# Patient Record
Sex: MALE | Race: AMERICAN INDIAN OR ALASKA NATIVE | HISPANIC OR LATINO
[De-identification: names, ages, dates, MRNs, and addresses within clinical notes are randomized per-mention and may not be internally consistent; named-entity substitution may affect disease eponyms.]

---

## 2020-09-24 PROBLEM — Z00.00 ENCOUNTER FOR PREVENTIVE HEALTH EXAMINATION: Status: ACTIVE | Noted: 2020-09-24

## 2020-10-06 ENCOUNTER — APPOINTMENT (OUTPATIENT)
Dept: ORTHOPEDIC SURGERY | Facility: CLINIC | Age: 70
End: 2020-10-06
Payer: MEDICARE

## 2020-10-06 VITALS — HEIGHT: 65 IN | BODY MASS INDEX: 24.16 KG/M2 | RESPIRATION RATE: 16 BRPM | WEIGHT: 145 LBS

## 2020-10-06 DIAGNOSIS — M72.0 PALMAR FASCIAL FIBROMATOSIS [DUPUYTREN]: ICD-10-CM

## 2020-10-06 DIAGNOSIS — Z86.39 PERSONAL HISTORY OF OTHER ENDOCRINE, NUTRITIONAL AND METABOLIC DISEASE: ICD-10-CM

## 2020-10-06 DIAGNOSIS — Z86.79 PERSONAL HISTORY OF OTHER DISEASES OF THE CIRCULATORY SYSTEM: ICD-10-CM

## 2020-10-06 PROCEDURE — 99203 OFFICE O/P NEW LOW 30 MIN: CPT

## 2020-10-06 RX ORDER — TAMSULOSIN HYDROCHLORIDE 0.4 MG/1
0.4 CAPSULE ORAL
Refills: 0 | Status: ACTIVE | COMMUNITY

## 2020-10-06 RX ORDER — PANTOPRAZOLE SODIUM 40 MG/1
40 TABLET, DELAYED RELEASE ORAL
Refills: 0 | Status: ACTIVE | COMMUNITY

## 2020-10-06 RX ORDER — VALSARTAN 40 MG/1
TABLET, COATED ORAL
Refills: 0 | Status: ACTIVE | COMMUNITY

## 2020-10-06 RX ORDER — FINASTERIDE 5 MG/1
5 TABLET, FILM COATED ORAL
Refills: 0 | Status: ACTIVE | COMMUNITY

## 2020-10-06 RX ORDER — METFORMIN HYDROCHLORIDE 1000 MG/1
1000 TABLET, COATED ORAL
Refills: 0 | Status: ACTIVE | COMMUNITY

## 2020-10-06 RX ORDER — ATENOLOL 25 MG/1
25 TABLET ORAL
Refills: 0 | Status: ACTIVE | COMMUNITY

## 2022-04-28 PROBLEM — G93.9 BRAIN LESION: Status: ACTIVE | Noted: 2022-04-28

## 2022-05-02 ENCOUNTER — NON-APPOINTMENT (OUTPATIENT)
Age: 72
End: 2022-05-02

## 2022-05-02 ENCOUNTER — APPOINTMENT (OUTPATIENT)
Dept: NEUROSURGERY | Facility: CLINIC | Age: 72
End: 2022-05-02
Payer: MEDICARE

## 2022-05-02 VITALS
WEIGHT: 142 LBS | HEIGHT: 65 IN | TEMPERATURE: 98.1 F | BODY MASS INDEX: 23.66 KG/M2 | SYSTOLIC BLOOD PRESSURE: 150 MMHG | HEART RATE: 55 BPM | OXYGEN SATURATION: 98 % | DIASTOLIC BLOOD PRESSURE: 71 MMHG

## 2022-05-02 DIAGNOSIS — G93.9 DISORDER OF BRAIN, UNSPECIFIED: ICD-10-CM

## 2022-05-02 PROCEDURE — 99205 OFFICE O/P NEW HI 60 MIN: CPT

## 2022-05-02 NOTE — REASON FOR VISIT
[New Patient Visit] : a new patient visit [Referred By: _________] : Patient was referred by TABITHA [Family Member] : family member

## 2022-05-02 NOTE — PHYSICAL EXAM
[General Appearance - Alert] : alert [General Appearance - In No Acute Distress] : in no acute distress [General Appearance - Well-Appearing] : healthy appearing [Oriented To Time, Place, And Person] : oriented to person, place, and time [Impaired Insight] : insight and judgment were intact [Affect] : the affect was normal [Memory Recent] : recent memory was not impaired [Cranial Nerves Optic (II)] : visual acuity intact bilaterally,  pupils equal round and reactive to light [Cranial Nerves Oculomotor (III)] : extraocular motion intact [Cranial Nerves Trigeminal (V)] : facial sensation intact symmetrically [Cranial Nerves Facial (VII)] : face symmetrical [Cranial Nerves Vestibulocochlear (VIII)] : hearing was intact bilaterally [Cranial Nerves Glossopharyngeal (IX)] : tongue and palate midline [Cranial Nerves Accessory (XI - Cranial And Spinal)] : head turning and shoulder shrug symmetric [Cranial Nerves Hypoglossal (XII)] : there was no tongue deviation with protrusion [Motor Tone] : muscle tone was normal in all four extremities [Motor Strength] : muscle strength was normal in all four extremities [Neck Appearance] : the appearance of the neck was normal [] : no respiratory distress [Respiration, Rhythm And Depth] : normal respiratory rhythm and effort [Heart Rate And Rhythm] : heart rate was normal and rhythm regular [Abnormal Walk] : normal gait [Skin Color & Pigmentation] : normal skin color and pigmentation [Sensation Tactile Decrease] : light touch was intact

## 2022-05-08 NOTE — ASSESSMENT
[FreeTextEntry1] : MRI brain with IAC with and without contrast 3/4/22 reviewed by Dr. Velazquez with patient, which demonstrates left- sided temporal based based brain mass, likely meningioma. \par \par Recommending MRI brain w/wo with nellie protocol in 6 months (August 2022) to evaluate stability. \par If growth, will discuss GKRS. \par Patient will RTC in 6 months following imaging completion to review with Dr. Velazquez.\par He should continue f/u with ENT in the interim. \par \par Patient and patient's family verbalize agreement and understanding with plan of care. \par \par I, Dr. Velazquez, personally performed the evaluation and management (E/M) services for this new patient. That E/M includes conducting the initial examination, assessing all conditions, and establishing the plan of care. Today, my ACP, Anuja Luevano, was here to observe my evaluation and management services for this patient to be followed going forward.

## 2022-05-08 NOTE — HISTORY OF PRESENT ILLNESS
[de-identified] : 71 y/o male with PMHx of DM, HTN, who presents today with recent finding of left- sided temporal based based brain mass on MRI at [facility] completed in workup for right- sided tinnitus. \par \par He is accompanied by high daughter Mary Kay for today's visit. \par Endorses right- tinnitus x 10 years and recent right sided ear infection for which he saw ENT who ordered MRI with IAC for further workup and was told incidental finding of left brain mass. He was then referred to neurologist Dr. Calvin who referred to Dr. Velazquez for neurosurgical evaluation. \par \par Endorses right sided hearing loss over the past few years, accompanied by worsening tinnitus. He went to ENT recently for worsening right ear infection. \par Denies headaches, balance issues, dizziness, seizure activity, visual changes, bowel/bladder issues, or new/worsening focal neuro deficits. \par \par

## 2022-05-08 NOTE — DATA REVIEWED
"""Informed patient that their capsular opacification is not visually significant or does not meet the minimum criteria for capsulotomy.  Recommended attention to PCO symptoms [de-identified] : SITE PERFORMED: [facility]\par SITE PHONE: [phone]\par Patient: ALVIN GUERRERO\par YOB: 1950\par Phone: [phone] \par MRN: [id number] Acc: [id number]\par Date of Exam: 03-\par  \par EXAM:  MR OF THE BRAIN AND INTERNAL AUDITORY CANALS WITHOUT AND WITH CONTRAST\par \par HISTORY: Right ear infections. Sensorineural hearing loss. \par \par TECHNIQUE: The brain was evaluated with 3 directional diffusion weighted sequence and postcontrast axial T1 and sagittal T2 FLAIR weighted sequences. The internal auditory canals and evaluated with axial T1, heavily T2 and post contrast fat-suppressed axial and coronal T1-weighted sequences. IV Contrast: 15 cc of Gadoterate Meglumine from a 15 cc single-use vial was injected. Scanner: [employer] MR at 1.2T .\par \par FINDINGS: Comparison: None of this region at [facility].  Ventricular and sulcal size are proportionate.  The midline structures are intact and nondisplaced. There is a small volume of chronic demyelination/gliosis in both cerebral hemispheres. The posterior fossa structures demonstrate normal intra-axial signal intensity. There is no restricted diffusion. There is no breakdown of the blood-brain barrier. There is no mass or pathologic enhancement in the cerebellopontine angles or internal auditory canals.  There are no intracranial extraaxial fluid collections. In the left posterior temporal region there is a dural based mass measuring 1.3 x 1.2 x 0.9 cm. This is hyperintense on the postcontrast T1-weighted sequences and is presumably enhancing. It has mild mass effect upon the adjacent gyri. The paranasal sinuses are clear. The mastoids are clear.  There is no enlargement of the pituitary gland.  The foramen magnum is normal. \par \par IMPRESSION: Left-sided posterior temporal dural based presumably enhancing mass most likely a meningioma with mild mass effect upon the adjacent gyri. Small volume of chronic demyelination/gliosis in both cerebral hemispheres. No evidence for acoustic neuroma. Clear middle ear clefts and mastoids.\par \par Thank you for the opportunity to participate in the care of this patient.  \par  \par Aditya Irwin MD  - Electronically Signed: 03- 8:17 AM \par Physician to Physician Direct Line is: [phone]

## 2022-05-08 NOTE — REVIEW OF SYSTEMS
[As Noted in HPI] : as noted in HPI [Fever] : no fever [Chills] : no chills [Confused or Disoriented] : no confusion [Facial Weakness] : no facial weakness [Arm Weakness] : no arm weakness [Hand Weakness] : no hand weakness [Leg Weakness] : no leg weakness [Numbness] : no numbness [Tingling] : no tingling [Seizures] : no convulsions [Dizziness] : no dizziness [Lightheadedness] : no lightheadedness [Difficulty Walking] : no difficulty walking [Eyesight Problems] : no eyesight problems [Chest Pain] : no chest pain [Palpitations] : no palpitations [Shortness Of Breath] : no shortness of breath [Cough] : no cough

## 2022-10-03 ENCOUNTER — APPOINTMENT (OUTPATIENT)
Dept: NEUROSURGERY | Facility: CLINIC | Age: 72
End: 2022-10-03

## 2022-10-03 ENCOUNTER — NON-APPOINTMENT (OUTPATIENT)
Age: 72
End: 2022-10-03

## 2022-10-03 VITALS
BODY MASS INDEX: 22.99 KG/M2 | TEMPERATURE: 98.1 F | SYSTOLIC BLOOD PRESSURE: 118 MMHG | HEART RATE: 66 BPM | WEIGHT: 138 LBS | OXYGEN SATURATION: 98 % | HEIGHT: 65 IN | DIASTOLIC BLOOD PRESSURE: 63 MMHG

## 2022-10-03 DIAGNOSIS — D32.9 BENIGN NEOPLASM OF MENINGES, UNSPECIFIED: ICD-10-CM

## 2022-10-03 PROCEDURE — 99214 OFFICE O/P EST MOD 30 MIN: CPT

## 2022-10-04 PROBLEM — D32.9 MENINGIOMA: Status: ACTIVE | Noted: 2022-10-04

## 2022-10-04 NOTE — HISTORY OF PRESENT ILLNESS
[de-identified] : 73 y/o male with PMHx of DM, HTN, who presents today with recent finding of left- sided temporal based based brain mass on MRI at [facility] completed in workup for right- sided tinnitus. \par \par He is accompanied by high daughter Mary Kay for today's visit. \par Endorses right- tinnitus x 10 years and recent right sided ear infection for which he saw ENT who ordered MRI with IAC for further workup and was told incidental finding of left brain mass. He was then referred to neurologist Dr. Calvin who referred to Dr. Velazquez for neurosurgical evaluation. \par \par Endorses right sided hearing loss over the past few years, accompanied by worsening tinnitus. He went to ENT recently for worsening right ear infection. \par \par Returns to the office today to review new MRI IACs with and without contrast done on 9/20/22 which demonstrates stable meningioma.\par \par Denies headaches, balance issues, dizziness, seizure activity, visual changes, bowel/bladder issues, or new/worsening focal neuro deficits. \par \par

## 2022-10-04 NOTE — ASSESSMENT
[FreeTextEntry1] : MRI brain with and without contrast done on 9/20/22 reviewed by Dr. Velazquez with patient demonstrates stable meningioma.\par Given stability, recommend repeat MRI brain in one year (September 2023)\par Instructed to call the office in August 2023 to schedule\par After MRI complete, RTO to review imaging.\par \par Patient and patient's family verbalize agreement and understanding with plan of care.\par \par I, Dr. Velazquez, personally performed the evaluation and management (E/M) services for this established patient who presents today with (a) new problem(s)/exacerbation of (an) existing condition(s).  That E/M includes conducting the examination, assessing all new/exacerbated conditions, and establishing a new plan of care.  Today, my ACP, Holly Rincon, was here to observe my evaluation and management services for this new problem/exacerbated condition to be followed going forward.\par \par

## 2022-10-04 NOTE — PHYSICAL EXAM
[General Appearance - Alert] : alert [General Appearance - In No Acute Distress] : in no acute distress [General Appearance - Well-Appearing] : healthy appearing [Oriented To Time, Place, And Person] : oriented to person, place, and time [Impaired Insight] : insight and judgment were intact [Affect] : the affect was normal [Memory Recent] : recent memory was not impaired [Cranial Nerves Optic (II)] : visual acuity intact bilaterally,  pupils equal round and reactive to light [Cranial Nerves Oculomotor (III)] : extraocular motion intact [Cranial Nerves Trigeminal (V)] : facial sensation intact symmetrically [Cranial Nerves Facial (VII)] : face symmetrical [Cranial Nerves Vestibulocochlear (VIII)] : hearing was intact bilaterally [Cranial Nerves Glossopharyngeal (IX)] : tongue and palate midline [Cranial Nerves Accessory (XI - Cranial And Spinal)] : head turning and shoulder shrug symmetric [Cranial Nerves Hypoglossal (XII)] : there was no tongue deviation with protrusion [Motor Tone] : muscle tone was normal in all four extremities [Motor Strength] : muscle strength was normal in all four extremities [Sensation Tactile Decrease] : light touch was intact [Neck Appearance] : the appearance of the neck was normal [] : no respiratory distress [Respiration, Rhythm And Depth] : normal respiratory rhythm and effort [Heart Rate And Rhythm] : heart rate was normal and rhythm regular [Abnormal Walk] : normal gait [Skin Color & Pigmentation] : normal skin color and pigmentation